# Patient Record
Sex: FEMALE | Race: WHITE | Employment: OTHER | URBAN - METROPOLITAN AREA
[De-identification: names, ages, dates, MRNs, and addresses within clinical notes are randomized per-mention and may not be internally consistent; named-entity substitution may affect disease eponyms.]

---

## 2024-04-22 ENCOUNTER — OFFICE VISIT (OUTPATIENT)
Dept: ORTHOPEDIC SURGERY | Facility: HOSPITAL | Age: 74
End: 2024-04-22
Payer: MEDICARE

## 2024-04-22 ENCOUNTER — HOSPITAL ENCOUNTER (OUTPATIENT)
Dept: RADIOLOGY | Facility: EXTERNAL LOCATION | Age: 74
Discharge: HOME | End: 2024-04-22

## 2024-04-22 DIAGNOSIS — M25.551 BILATERAL HIP PAIN: Primary | ICD-10-CM

## 2024-04-22 DIAGNOSIS — M67.952 TENDINOPATHY OF LEFT GLUTEUS MEDIUS: ICD-10-CM

## 2024-04-22 DIAGNOSIS — M25.552 BILATERAL HIP PAIN: Primary | ICD-10-CM

## 2024-04-22 DIAGNOSIS — M67.951 TENDINOPATHY OF RIGHT GLUTEUS MEDIUS: ICD-10-CM

## 2024-04-22 PROCEDURE — 2500000004 HC RX 250 GENERAL PHARMACY W/ HCPCS (ALT 636 FOR OP/ED): Performed by: EMERGENCY MEDICINE

## 2024-04-22 PROCEDURE — 99214 OFFICE O/P EST MOD 30 MIN: CPT | Performed by: EMERGENCY MEDICINE

## 2024-04-22 PROCEDURE — 76942 ECHO GUIDE FOR BIOPSY: CPT | Performed by: EMERGENCY MEDICINE

## 2024-04-22 PROCEDURE — 2500000005 HC RX 250 GENERAL PHARMACY W/O HCPCS: Performed by: EMERGENCY MEDICINE

## 2024-04-22 RX ORDER — TRIAMCINOLONE ACETONIDE 40 MG/ML
40 INJECTION, SUSPENSION INTRA-ARTICULAR; INTRAMUSCULAR
Status: COMPLETED | OUTPATIENT
Start: 2024-04-22 | End: 2024-04-22

## 2024-04-22 RX ORDER — ASPIRIN 81 MG/1
81 TABLET ORAL DAILY
COMMUNITY

## 2024-04-22 RX ORDER — CELECOXIB 200 MG/1
200 CAPSULE ORAL 2 TIMES DAILY
COMMUNITY

## 2024-04-22 RX ORDER — ALBUTEROL SULFATE 90 UG/1
AEROSOL, METERED RESPIRATORY (INHALATION)
COMMUNITY

## 2024-04-22 RX ORDER — TRIAMCINOLONE ACETONIDE 1 MG/G
CREAM TOPICAL
COMMUNITY

## 2024-04-22 RX ORDER — VALSARTAN 160 MG/1
1 TABLET ORAL
COMMUNITY
Start: 2022-09-19

## 2024-04-22 RX ORDER — AMOXICILLIN AND CLAVULANATE POTASSIUM 875; 125 MG/1; MG/1
875 TABLET, FILM COATED ORAL 2 TIMES DAILY
COMMUNITY
Start: 2024-04-21 | End: 2024-05-01

## 2024-04-22 RX ORDER — DUPILUMAB 200 MG/1.14ML
INJECTION, SOLUTION SUBCUTANEOUS
COMMUNITY

## 2024-04-22 RX ORDER — LIDOCAINE HYDROCHLORIDE 10 MG/ML
3 INJECTION INFILTRATION; PERINEURAL
Status: COMPLETED | OUTPATIENT
Start: 2024-04-22 | End: 2024-04-22

## 2024-04-22 RX ORDER — EPINEPHRINE 0.22MG
100 AEROSOL WITH ADAPTER (ML) INHALATION 2 TIMES DAILY
COMMUNITY

## 2024-04-22 RX ORDER — DULOXETIN HYDROCHLORIDE 60 MG/1
1 CAPSULE, DELAYED RELEASE ORAL
COMMUNITY
Start: 2024-01-03

## 2024-04-22 RX ORDER — HYDROCORTISONE 25 MG/G
CREAM TOPICAL
COMMUNITY
Start: 2022-09-08

## 2024-04-22 RX ORDER — SIMVASTATIN 20 MG/1
20 TABLET, FILM COATED ORAL
COMMUNITY

## 2024-04-22 RX ORDER — METOPROLOL SUCCINATE 50 MG/1
50 TABLET, EXTENDED RELEASE ORAL
COMMUNITY
Start: 2023-08-09

## 2024-04-22 RX ADMIN — LIDOCAINE HYDROCHLORIDE 3 ML: 10 INJECTION, SOLUTION INFILTRATION; PERINEURAL at 15:16

## 2024-04-22 RX ADMIN — LIDOCAINE HYDROCHLORIDE 3 ML: 10 INJECTION, SOLUTION INFILTRATION; PERINEURAL at 15:15

## 2024-04-22 RX ADMIN — TRIAMCINOLONE ACETONIDE 40 MG: 400 INJECTION, SUSPENSION INTRA-ARTICULAR; INTRAMUSCULAR at 15:16

## 2024-04-22 RX ADMIN — TRIAMCINOLONE ACETONIDE 40 MG: 400 INJECTION, SUSPENSION INTRA-ARTICULAR; INTRAMUSCULAR at 15:15

## 2024-08-22 ENCOUNTER — PREP FOR PROCEDURE (OUTPATIENT)
Dept: ORTHOPEDIC SURGERY | Facility: HOSPITAL | Age: 74
End: 2024-08-22

## 2024-08-22 ENCOUNTER — OFFICE VISIT (OUTPATIENT)
Dept: ORTHOPEDIC SURGERY | Facility: HOSPITAL | Age: 74
End: 2024-08-22
Payer: MEDICARE

## 2024-08-22 DIAGNOSIS — M67.951 TENDINOPATHY OF RIGHT GLUTEUS MEDIUS: ICD-10-CM

## 2024-08-22 DIAGNOSIS — M67.952 TENDINOPATHY OF LEFT GLUTEUS MEDIUS: ICD-10-CM

## 2024-08-22 DIAGNOSIS — M67.952 TENDINOPATHY OF LEFT GLUTEUS MEDIUS: Primary | ICD-10-CM

## 2024-08-22 PROCEDURE — 99213 OFFICE O/P EST LOW 20 MIN: CPT | Performed by: EMERGENCY MEDICINE

## 2024-08-22 PROCEDURE — 1159F MED LIST DOCD IN RCRD: CPT | Performed by: EMERGENCY MEDICINE

## 2024-08-22 NOTE — PROGRESS NOTES
Leilani Sullivan is a 73 y.o. female who presents for Follow-up of the Right Hip (DOLI- 4/22/24) and Follow-up of the Left Hip (DOLI- 4/22/24)    HPI    8/22/24: Patient returns today for reevaluation for bilateral lateral hip pain.  We did perform gluteus medius tendon insertion injections for her on 4/22/2024.  She does feel these worked well for her, however only for 1 month.  Considering this, she would like to discuss additional treatment options.    4/22/24: Patient returns today for bilateral hip pain.  We did perform bilateral hip gluteus medius tendon insertion injections on 1/9/2023.  She felt that they worked quite well and would like to have repeat injections today.  Since her last visit, she did have a pelvis MRI showing bilateral gluteus medius tendinosis without significant tearing.  She states that the previous injections did provide significant relief for approximately 3 to 4 months.  Additionally, she has low back pain and lumbar stenosis.    1/9/23: Patient returns today for bilateral hip pain. We did perform bilateral hip gluteus medius tendon injections on 1/31/2022. She states these were quite well for her up until recently. She presents today requesting repeat injections. She denies any further injuries. She has no additional complaints today.     1/31/22: 71-year-old female referred by Dr. Das for bilateral hip gluteus medius tendon insertion injections. She was evaluated and given home exercises, however would also like to have therapeutic injections.       ROS: All pertinent positive symptoms are included in the history of present illness.    All other systems have been reviewed and are negative and noncontributory to this patient's current ailments.    Objective     There were no vitals filed for this visit.    Physical Exam  General/Constitutional: No apparent distress. Well-nourished and well developed.  Head: Normocephalic, Atraumatic.   Eyes: EOMI.  Vascular: No edema,  swelling or tenderness, except as noted in detailed exam.  Respiratory: Non-labored breathing.  Integumentary: No impressive skin lesions present, except as noted in detailed exam.  Neurological: Alert and oriented.  Psychological: Normal mood and affect.  Musculoskeletal: Normal, except as noted in detailed exam.  Right hip: No rash. No erythema. No deformity. Exquisite TTP over the greater trochanter.  Left hip: No rash. No erythema. No deformity. Exquisite TTP over the greater trochanter.     Imaging:   I have personally reviewed and agree with Radiologist interpretation of previous imaging studies performed prior to this visit.   STUDY:  MRI of the pelvis without IV contrast;  5/3/2023 3:01 pm     INDICATION:  M76.899 Other specified enthesopathies of unspecified lower limb,  excluding foot   .     COMPARISON:  None.     ACCESSION NUMBER(S):  [id number]     ORDERING CLINICIAN:  JANA MANNING     TECHNIQUE:  MR imaging of the musculoskeletal pelvis was obtained  without  administration of intravenous contrast medium.     FINDINGS:  TENDONS:  There is thickening and edema of the bilateral gluteus minimus  tendons consistent with moderate tendinosis. Mild adjacent soft  tissue edema without trochanteric bursal fluid. The bilateral gluteus  minimus tendons are intact. There is mild bilateral hamstring origin  tendinosis with low grade intrasubstance tearing.  The insertions of the iliopsoas tendons are intact bilaterally.  The adductor tendon origins are intact bilaterally.     JOINTS:  There is mild diffuse thinning of the bilateral hip articular  cartilage.. There is no significant hip joint effusion. There is no  evidence of avascular necrosis.  The sacroiliac joints appear unremarkable without evidence of erosion  or significant degenerative change.  Moderate pubic symphyseal  degenerative change.  Lower lumbar degenerative changes are better characterized on  concomitant lumbar spine MRI.     OSSEOUS  STRUCTURES:  No focal marrow replacing lesions are identified. There is no  fracture.     SOFT TISSUES:  No muscle atrophy or tear is seen.  The sciatic nerves are intact and unremarkable.     INTERNAL ORGANS:  Evaluation of the internal organs of the pelvis is limited on this  study tailored for evaluation of the musculoskeletal system.  There is a fat containing ventral abdominal hernia.      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     Mild bilateral hip articular cartilage thinning.     Moderate pubic symphyseal degenerative change.     Fat containing ventral abdominal wall hernia.       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  I do feel that her symptoms are due to gluteus medius tendinopathy.  Considering continued pain despite previous therapeutic options including physical therapy and previous corticosteroid injections, I do feel that she would benefit from percutaneous tenotomy.  We discussed the risks versus benefits.  I have given her additional information for this.  She would likely schedule this in the near future.  She will likely proceed with the left hip first since that is more bothersome for her.  In the meantime, she can continue all activities as tolerated.    Steven Brink, DO  Sports Medicine  [facility], [facility] [place]    ** Please excuse any errors in grammar or translation related to this dictation. Voice recognition software was utilized to prepare this document. **

## 2024-10-24 NOTE — H&P
History Of Present Illness  Lissett Sullivan is a 74 y.o. female presenting with complaint of left lateral hip pain.  She has known bilateral hip gluteus medius tendinopathy.  We have tried previous conservative measures including activity modifications, physical therapy, analgesics, and corticosteroid injection therapy with limited relief.  Her most recent gluteus medius corticosteroid injection was performed on 4/22/2024.  Considering continued pain despite these previous measures, she presents today discuss the option of percutaneous tenotomy.     Past Medical History  She has no past medical history on file.    Surgical History  She has no past surgical history on file.     Social History  She has no history on file for tobacco use, alcohol use, and drug use.    Family History  No family history on file.     Allergies  Levofloxacin, Oseltamivir, Piroxicam, Adhesive, Adhesive tape-silicones, and Iodine    Review of Systems  All pertinent positive symptoms are included in the history of present illness.     All other systems have been reviewed and are negative and noncontributory to this patient's current ailments.       Physical Exam  General/Constitutional: No apparent distress. Well-nourished and well developed.  Head: Normocephalic, Atraumatic.   Eyes: EOMI.  Vascular: No edema, swelling or tenderness, except as noted in detailed exam.  Respiratory: Non-labored breathing.  Integumentary: No impressive skin lesions present, except as noted in detailed exam.  Neurological: Alert and oriented.  Psychological: Normal mood and affect.  Musculoskeletal: Normal, except as noted in detailed exam.  Right hip: No rash. No erythema. No deformity. Exquisite TTP over the greater trochanter.  Left hip: No rash. No erythema. No deformity. Exquisite TTP over the greater trochanter.        Last Recorded Vitals  There were no vitals taken for this visit.    Relevant Results  STUDY:  MRI of the pelvis without IV contrast;   5/3/2023 3:01 pm     INDICATION:  M76.899 Other specified enthesopathies of unspecified lower limb,  excluding foot   .     COMPARISON:  None.     ACCESSION NUMBER(S):  [id number]     ORDERING CLINICIAN:  JANA MANNING     TECHNIQUE:  MR imaging of the musculoskeletal pelvis was obtained  without  administration of intravenous contrast medium.     FINDINGS:  TENDONS:  There is thickening and edema of the bilateral gluteus minimus  tendons consistent with moderate tendinosis. Mild adjacent soft  tissue edema without trochanteric bursal fluid. The bilateral gluteus  minimus tendons are intact. There is mild bilateral hamstring origin  tendinosis with low grade intrasubstance tearing.  The insertions of the iliopsoas tendons are intact bilaterally.  The adductor tendon origins are intact bilaterally.     JOINTS:  There is mild diffuse thinning of the bilateral hip articular  cartilage.. There is no significant hip joint effusion. There is no  evidence of avascular necrosis.  The sacroiliac joints appear unremarkable without evidence of erosion  or significant degenerative change.  Moderate pubic symphyseal  degenerative change.  Lower lumbar degenerative changes are better characterized on  concomitant lumbar spine MRI.     OSSEOUS STRUCTURES:  No focal marrow replacing lesions are identified. There is no  fracture.     SOFT TISSUES:  No muscle atrophy or tear is seen.  The sciatic nerves are intact and unremarkable.     INTERNAL ORGANS:  Evaluation of the internal organs of the pelvis is limited on this  study tailored for evaluation of the musculoskeletal system.  There is a fat containing ventral abdominal hernia.     Impression  Moderate bilateral gluteus minimus insertional tendinosis without  tear.     Mild bilateral hip articular cartilage thinning.     Moderate pubic symphyseal degenerative change.     Fat containing ventral abdominal wall hernia.          Assessment/Plan   Assessment & Plan  Tendinopathy of left  gluteus medius    I discussed imaging and examination findings with patient.  I do feel that her symptoms are due to gluteus medius tendinopathy.  Considering continued pain despite previous therapeutic options including physical therapy and previous corticosteroid injections, I do feel that she would benefit from percutaneous tenotomy.  We discussed the risks versus benefits.  I have given her additional information for this.  She opted to proceed and returns today to proceed with a left hip gluteus medius percutaneous tenotomy procedure.         Rickey Brink, DO

## 2024-10-25 ENCOUNTER — HOSPITAL ENCOUNTER (OUTPATIENT)
Facility: CLINIC | Age: 74
Setting detail: OUTPATIENT SURGERY
Discharge: HOME | End: 2024-10-25
Attending: EMERGENCY MEDICINE | Admitting: EMERGENCY MEDICINE
Payer: MEDICARE

## 2024-10-25 VITALS
HEIGHT: 63 IN | OXYGEN SATURATION: 99 % | WEIGHT: 198.63 LBS | RESPIRATION RATE: 18 BRPM | TEMPERATURE: 98.1 F | BODY MASS INDEX: 35.2 KG/M2 | SYSTOLIC BLOOD PRESSURE: 134 MMHG | DIASTOLIC BLOOD PRESSURE: 61 MMHG | HEART RATE: 89 BPM

## 2024-10-25 DIAGNOSIS — M67.952 TENDINOPATHY OF LEFT GLUTEUS MEDIUS: Primary | ICD-10-CM

## 2024-10-25 PROCEDURE — 2720000007 HC OR 272 NO HCPCS: Performed by: EMERGENCY MEDICINE

## 2024-10-25 PROCEDURE — 7100000010 HC PHASE TWO TIME - EACH INCREMENTAL 1 MINUTE: Performed by: EMERGENCY MEDICINE

## 2024-10-25 PROCEDURE — 3600000008 HC OR TIME - EACH INCREMENTAL 1 MINUTE - PROCEDURE LEVEL THREE: Performed by: EMERGENCY MEDICINE

## 2024-10-25 PROCEDURE — 2500000004 HC RX 250 GENERAL PHARMACY W/ HCPCS (ALT 636 FOR OP/ED): Performed by: EMERGENCY MEDICINE

## 2024-10-25 PROCEDURE — 3600000003 HC OR TIME - INITIAL BASE CHARGE - PROCEDURE LEVEL THREE: Performed by: EMERGENCY MEDICINE

## 2024-10-25 PROCEDURE — 7100000009 HC PHASE TWO TIME - INITIAL BASE CHARGE: Performed by: EMERGENCY MEDICINE

## 2024-10-25 PROCEDURE — 27000 TENOTOMY ADDUCTOR HIP PERQ: CPT | Performed by: EMERGENCY MEDICINE

## 2024-10-25 PROCEDURE — 76942 ECHO GUIDE FOR BIOPSY: CPT | Performed by: EMERGENCY MEDICINE

## 2024-10-25 RX ORDER — OXYCODONE HYDROCHLORIDE 5 MG/1
5 TABLET ORAL EVERY 8 HOURS PRN
Qty: 9 TABLET | Refills: 0 | Status: SHIPPED | OUTPATIENT
Start: 2024-10-25 | End: 2024-10-28

## 2024-10-25 RX ORDER — LIDOCAINE HYDROCHLORIDE AND EPINEPHRINE 10; 10 MG/ML; UG/ML
INJECTION, SOLUTION INFILTRATION; PERINEURAL AS NEEDED
Status: DISCONTINUED | OUTPATIENT
Start: 2024-10-25 | End: 2024-10-25 | Stop reason: HOSPADM

## 2024-10-25 ASSESSMENT — PAIN - FUNCTIONAL ASSESSMENT
PAIN_FUNCTIONAL_ASSESSMENT: 0-10

## 2024-10-25 ASSESSMENT — COLUMBIA-SUICIDE SEVERITY RATING SCALE - C-SSRS
2. HAVE YOU ACTUALLY HAD ANY THOUGHTS OF KILLING YOURSELF?: NO
1. IN THE PAST MONTH, HAVE YOU WISHED YOU WERE DEAD OR WISHED YOU COULD GO TO SLEEP AND NOT WAKE UP?: NO
6. HAVE YOU EVER DONE ANYTHING, STARTED TO DO ANYTHING, OR PREPARED TO DO ANYTHING TO END YOUR LIFE?: NO

## 2024-10-25 ASSESSMENT — PAIN SCALES - GENERAL
PAINLEVEL_OUTOF10: 3
PAINLEVEL_OUTOF10: 8
PAINLEVEL_OUTOF10: 3

## 2024-10-25 NOTE — DISCHARGE INSTRUCTIONS
Post Procedure Instructions for Tenex or TenJet    Keep bandages and procedure area clean and dry for 7 days.   2.  Avoid submerging area in water (example, swimming/bathing) for 7 days.  3. May apply ice for 20 minutes as needed for discomfort.  4. May take over the counter pain medication or prescriptions, if provided.  5. Please make sure to follow up within one week or as directed by Dr. Brink.  6 Call [phone] (daytime) or [phone] (night/weekends) if you have questions or concerns.  7. E-mail [email or web address]    Please contact office if:  Area becomes red or hot to touch   Fever of 100 degrees or more  Increased pain or swelling  Drainage from treatment site    SPECIFIC PATIENT INSTRUCTIONS    HIP  Rest hip for first 3 days  Ambulatory assistance PRN  May being routine daily activities at 2 weeks  May gradually resume use of hip at 2 weeks as tolerated and subject to physician approval

## 2024-10-25 NOTE — OP NOTE
Left hip Glute Medius Tenjet Procedure (L) Operative Note     Date: 10/25/2024  OR Location: [facility] OR    Name: Lissett Sullivan, : 1950, Age: 74 y.o., MRN: [id number], Sex: female    Diagnosis  Pre-op Diagnosis      * Tendinopathy of left gluteus medius [M67.952] Post-op Diagnosis     * Tendinopathy of left gluteus medius [M67.952]     Procedures  Left hip Glute Medius Tenjet Procedure  30169 - [place] TENOTOMY ADDUCTOR HIP PERCUTANEOUS SPX      Surgeons      * Rickey Brink - Primary    Resident/Fellow/Other Assistant:  Surgeons and Role:  * No surgeons found with a matching role *    Procedure Summary  Anesthesia: Anesthesia type not filed in the log.  ASA: ASA status not filed in the log.  Anesthesia Staff: No anesthesia staff entered.  Estimated Blood Loss: 2mL  Intra-op Medications: Administrations occurring from 1330 to 1400 on 10/25/24:  * No intraprocedure medications in log *      Intraprocedure I/O Totals       None           Specimen: No specimens collected     Staff:   Circulator: Neema  Circulator: Sammi Jorge Person: Sean         Drains and/or Catheters: * None in log *    Tourniquet Times:         Implants:     Findings: Left Hip Gluteus Medius Tendinopathy    Indications: Lissett Sullivan is an 74 y.o. female who is having surgery for Tendinopathy of left gluteus medius [M67.952].     The patient was seen in the preoperative area. The risks, benefits, complications, treatment options, non-operative alternatives, expected recovery and outcomes were discussed with the patient. The possibilities of reaction to medication, pulmonary aspiration, injury to surrounding structures, bleeding, recurrent infection, the need for additional procedures, failure to diagnose a condition, and creating a complication requiring transfusion or operation were discussed with the patient. The patient concurred with the proposed plan, giving informed consent.  The site of surgery was properly noted/marked  if necessary per policy. The patient has been actively warmed in preoperative area. Preoperative antibiotics are not indicated. Venous thrombosis prophylaxis are not indicated.    Procedure Details:     PATIENT INDICATIONS: Patient presented with left Hip Gluteus Medius Tendinopathy.  PROCEDURE PERFORMED:  Percutaneous tenotomy of the gluteus medius tendon using ultrasound guidance to cut and remove the patient’s pathologic tissue, tendon and fascia.   DESCRIPTION OF PROCEDURE:   A sterile sleeve was placed over the ultrasound transducer. The anatomy was identified and the diseased tissue was visualized or confirmed at the gluteus medius tendon near the insertion. The area was prepped with an antimicrobial solution and the area was injected with [10]ml of 1% Lidocaine with Epinephrine using a 25 gauge needle. A skin wheal was placed and the involved tissue was anesthetized. A #11 blade was used to incise through the skin wheal about 1.5 cm distal to the site of maximum tenderness. The blade continued through the subcutaneous tissue and incised the tendon/fascia.  To section the gluteus medius tendon the surgical instrument is introduced through the incision advancing to the diseased tendon. Once the tip of instrument confirmed the pathologic tissue, the foot pedal was depressed and the tendon is incised along its length cutting and removing the diseased tendon tissue. Total tenotomy TenJte fluid 1L.  Following the procedure, steri-strips were placed, followed by Tegaderm. Patient was provided crutches to weight bear as tolerated for the next 2 days, then full weight bearing.   Post-op instructions given: Keep dressing clean and dry over the next week, take Oxycodone 5mg tablet every 8 hours as needed for pain, follow-up in my office in 1 week.    Complications:  None; patient tolerated the procedure well.    Disposition: PACU - hemodynamically stable.  Condition: stable         Rickey Brink  Phone Number:  [phone]

## 2024-10-28 ASSESSMENT — PAIN SCALES - GENERAL: PAINLEVEL_OUTOF10: 0 - NO PAIN

## 2024-11-01 ENCOUNTER — APPOINTMENT (OUTPATIENT)
Dept: ORTHOPEDIC SURGERY | Facility: CLINIC | Age: 74
End: 2024-11-01
Payer: COMMERCIAL

## 2024-11-01 VITALS — WEIGHT: 198 LBS | HEIGHT: 63 IN | BODY MASS INDEX: 35.08 KG/M2

## 2024-11-01 DIAGNOSIS — M67.952 TENDINOPATHY OF LEFT GLUTEUS MEDIUS: ICD-10-CM

## 2024-11-01 PROCEDURE — 99024 POSTOP FOLLOW-UP VISIT: CPT | Performed by: EMERGENCY MEDICINE

## 2024-11-01 PROCEDURE — 1159F MED LIST DOCD IN RCRD: CPT | Performed by: EMERGENCY MEDICINE

## 2024-11-01 PROCEDURE — 3008F BODY MASS INDEX DOCD: CPT | Performed by: EMERGENCY MEDICINE

## 2024-11-01 PROCEDURE — 1125F AMNT PAIN NOTED PAIN PRSNT: CPT | Performed by: EMERGENCY MEDICINE

## 2024-11-01 ASSESSMENT — PAIN - FUNCTIONAL ASSESSMENT: PAIN_FUNCTIONAL_ASSESSMENT: 0-10

## 2024-11-01 ASSESSMENT — PAIN DESCRIPTION - DESCRIPTORS: DESCRIPTORS: SORE

## 2024-11-01 ASSESSMENT — PAIN SCALES - GENERAL: PAINLEVEL_OUTOF10: 2

## 2024-11-02 ENCOUNTER — HOSPITAL ENCOUNTER (OUTPATIENT)
Facility: CLINIC | Age: 74
Setting detail: OUTPATIENT SURGERY
End: 2024-11-02
Attending: EMERGENCY MEDICINE | Admitting: EMERGENCY MEDICINE
Payer: COMMERCIAL

## 2024-11-02 ENCOUNTER — PREP FOR PROCEDURE (OUTPATIENT)
Dept: ORTHOPEDIC SURGERY | Facility: HOSPITAL | Age: 74
End: 2024-11-02
Payer: COMMERCIAL

## 2024-11-02 DIAGNOSIS — M67.951 TENDINOPATHY OF RIGHT GLUTEUS MEDIUS: ICD-10-CM

## 2024-12-06 ENCOUNTER — APPOINTMENT (OUTPATIENT)
Dept: ORTHOPEDIC SURGERY | Facility: CLINIC | Age: 74
End: 2024-12-06
Payer: COMMERCIAL

## 2024-12-06 DIAGNOSIS — M67.952 TENDINOPATHY OF LEFT GLUTEUS MEDIUS: ICD-10-CM

## 2024-12-06 NOTE — PROGRESS NOTES
Leilani Sullivan is a 74 y.o. female who presents for No chief complaint on file.    HPI    12/6/24: Patient returns today for reevaluation status post left hip gluteus medius tendon percutaneous tenotomy.  Date of procedure 10/25/2024.  Overall, she does feel that she is improving.  She does feel approximately 50% better.  She has been working on physical therapy.  No additional injuries or complaints.    11/1/24: 74-year-old female presents today for reevaluation status post left hip gluteus medius tendon percutaneous tenotomy.  Date of procedure 10/25/2024.  Overall, she feels that she is doing well.  She does feel that her pain has improved significantly since prior to the procedure.  She has not required ambulatory assistance.  She has been able to keep the wound clean and dry.    ROS: All pertinent positive symptoms are included in the history of present illness.    All other systems have been reviewed and are negative and noncontributory to this patient's current ailments.    Objective     There were no vitals filed for this visit.      Physical Exam  General/Constitutional: No apparent distress. Well-nourished and well developed.  Head: Normocephalic, Atraumatic.   Eyes: EOMI.  Vascular: No edema, swelling or tenderness, except as noted in detailed exam.  Respiratory: Non-labored breathing.  Integumentary: No impressive skin lesions present, except as noted in detailed exam.  Neurological: Alert and oriented.  Psychological:  Normal mood and affect.  Musculoskeletal: Normal, except as noted in detailed exam.  Left hip: Incision well-healed.  Ecchymosis resolved.  Mild tenderness along the peritrochanteric region.  Full strength and range of motion with abduction.        Assessment/Plan   Problem List Items Addressed This Visit       Tendinopathy of left gluteus medius    Relevant Orders    Referral to Physical Therapy     Overall, I feel that she is doing well.  Incision is well-healed without  signs of infection.  At this point, she can progress to all activities without restriction.  I have given her an updated physical therapy referral to this regard.  I would like see her back as needed if pain persists.    Steven Brink, DO  Sports Medicine  [facility], [facility] [place]    ** Please excuse any errors in grammar or translation related to this dictation. Voice recognition software was utilized to prepare this document. **

## 2024-12-20 ENCOUNTER — TELEPHONE (OUTPATIENT)
Dept: ORTHOPEDIC SURGERY | Facility: CLINIC | Age: 74
End: 2024-12-20
Payer: COMMERCIAL

## 2024-12-20 NOTE — TELEPHONE ENCOUNTER
Copied from CRM #[id number]. Topic: Information Request - Doctor, Hospital, or Provider  >> Dec 18, 2024 12:25 PM Ashlee WASHINGTON wrote:  PT WOULD LIKE Rickey Brink TO GIVE HER A CALL IN REGARDS TO SURGERY DATE

## 2024-12-23 ENCOUNTER — PREP FOR PROCEDURE (OUTPATIENT)
Dept: ORTHOPEDIC SURGERY | Facility: HOSPITAL | Age: 74
End: 2024-12-23
Payer: COMMERCIAL

## 2025-01-17 ENCOUNTER — APPOINTMENT (OUTPATIENT)
Dept: ORTHOPEDIC SURGERY | Facility: CLINIC | Age: 75
End: 2025-01-17
Payer: COMMERCIAL

## 2025-01-31 ENCOUNTER — TELEPHONE (OUTPATIENT)
Dept: ORTHOPEDIC SURGERY | Facility: HOSPITAL | Age: 75
End: 2025-01-31
Payer: COMMERCIAL

## 2025-01-31 NOTE — TELEPHONE ENCOUNTER
Called patient in response to a message that she left with the office stating that she had accidentally cancelled her appointment and rescheduled and was worried about her x-rays still being done. Spoke with patient and let her know that her appointment was on the schedule for both her visit with Dr. Abraham and her x-rays for 10 am on Monday. Let her know that if she arrived 5-10 minutes early that would be plenty. She asked if the x-ray would be done in the same location as the visit. I let her know that yes they would both be done in [address] of the [facility] at [facility]. [place]

## 2025-02-03 ENCOUNTER — HOSPITAL ENCOUNTER (OUTPATIENT)
Dept: RADIOLOGY | Facility: HOSPITAL | Age: 75
Discharge: HOME | End: 2025-02-03
Payer: MEDICARE

## 2025-02-03 ENCOUNTER — APPOINTMENT (OUTPATIENT)
Dept: ORTHOPEDIC SURGERY | Facility: HOSPITAL | Age: 75
End: 2025-02-03
Payer: COMMERCIAL

## 2025-02-03 ENCOUNTER — OFFICE VISIT (OUTPATIENT)
Dept: ORTHOPEDIC SURGERY | Facility: HOSPITAL | Age: 75
End: 2025-02-03
Payer: MEDICARE

## 2025-02-03 ENCOUNTER — APPOINTMENT (OUTPATIENT)
Dept: RADIOLOGY | Facility: HOSPITAL | Age: 75
End: 2025-02-03
Payer: COMMERCIAL

## 2025-02-03 VITALS — BODY MASS INDEX: 32.78 KG/M2 | WEIGHT: 185 LBS | HEIGHT: 63 IN

## 2025-02-03 DIAGNOSIS — M54.12 CERVICAL RADICULOPATHY: Primary | ICD-10-CM

## 2025-02-03 DIAGNOSIS — M25.512 BILATERAL SHOULDER PAIN, UNSPECIFIED CHRONICITY: ICD-10-CM

## 2025-02-03 DIAGNOSIS — M25.511 BILATERAL SHOULDER PAIN, UNSPECIFIED CHRONICITY: Primary | ICD-10-CM

## 2025-02-03 DIAGNOSIS — M25.512 BILATERAL SHOULDER PAIN, UNSPECIFIED CHRONICITY: Primary | ICD-10-CM

## 2025-02-03 DIAGNOSIS — M25.511 BILATERAL SHOULDER PAIN, UNSPECIFIED CHRONICITY: ICD-10-CM

## 2025-02-03 PROCEDURE — 99213 OFFICE O/P EST LOW 20 MIN: CPT | Performed by: ORTHOPAEDIC SURGERY

## 2025-02-03 PROCEDURE — 73030 X-RAY EXAM OF SHOULDER: CPT | Mod: 50

## 2025-02-03 PROCEDURE — 73030 X-RAY EXAM OF SHOULDER: CPT | Mod: BILATERAL PROCEDURE | Performed by: RADIOLOGY

## 2025-02-03 PROCEDURE — 3008F BODY MASS INDEX DOCD: CPT | Performed by: ORTHOPAEDIC SURGERY

## 2025-02-03 PROCEDURE — 1159F MED LIST DOCD IN RCRD: CPT | Performed by: ORTHOPAEDIC SURGERY

## 2025-02-03 RX ORDER — METHYLPREDNISOLONE 4 MG/1
TABLET ORAL
Qty: 21 TABLET | Refills: 0 | Status: SHIPPED | OUTPATIENT
Start: 2025-02-03 | End: 2025-02-09

## 2025-02-06 ENCOUNTER — HOSPITAL ENCOUNTER (OUTPATIENT)
Facility: CLINIC | Age: 75
Setting detail: OUTPATIENT SURGERY
End: 2025-02-06
Attending: EMERGENCY MEDICINE | Admitting: EMERGENCY MEDICINE
Payer: MEDICARE

## 2025-02-06 ENCOUNTER — PREP FOR PROCEDURE (OUTPATIENT)
Dept: ORTHOPEDIC SURGERY | Facility: HOSPITAL | Age: 75
End: 2025-02-06
Payer: COMMERCIAL

## 2025-02-06 DIAGNOSIS — M67.951 TENDINOPATHY OF RIGHT GLUTEUS MEDIUS: ICD-10-CM

## 2025-02-06 NOTE — PROGRESS NOTES
This is a 74-year-old female comes in today for chief complaint of bilateral shoulder pain right worse than left been going on for about 2 or so.  She denies a specific injury she says she has pain radiating from her neck into her shoulder and then down into her hands right side worse than left side.  She has not had any treatment for this.  Comes to see me today for further evaluation    Pleasant patient in no acute distress, alert and oriented x3, of normal mood and affect    they have no cervical or axillary lymphadenopathy.  they are breathing without any evidence of accessory musculature.  Their pupils are equal and reactive to light    Their neck is supple, nontender  They have intact sensation to light touch in all upper extremity dermatomes.  Their skin is intact    They have forward elevation of the shoulder 180°, internal rotation of 90°, external rotation of 90°  They Have 5 out of 5 Rotator cuff strength testing with resisted supraspinatus testing and infraspinatus testing  They have a normal belly press and lift off    They have mild tenderness to palpation over the long head of the biceps in the groove  They have a negative speed's test  They have an equivocal Yergason's test  They have s a positive Neer test, positive Barrios test    No a.c. joint tenderness.  Negative cross body    They have a negative O'Briens    X-rays of the shoulder reveal no fractures or dislocations well-preserved joint space    Patient's symptoms are most consistent with a cervical radiculopathy we are recommending a steroid Dosepak as well as physical therapy for her neck.  If she does not see significant improvement we will have her follow-up with Dr. Xenia Cosby

## 2025-02-21 ENCOUNTER — APPOINTMENT (OUTPATIENT)
Dept: ORTHOPEDIC SURGERY | Facility: CLINIC | Age: 75
End: 2025-02-21
Payer: COMMERCIAL

## 2025-02-26 ENCOUNTER — APPOINTMENT (OUTPATIENT)
Dept: ORTHOPEDIC SURGERY | Facility: HOSPITAL | Age: 75
End: 2025-02-26
Payer: MEDICARE

## 2025-03-03 ENCOUNTER — TELEPHONE (OUTPATIENT)
Dept: ORTHOPEDIC SURGERY | Facility: HOSPITAL | Age: 75
End: 2025-03-03
Payer: COMMERCIAL

## 2025-03-03 NOTE — TELEPHONE ENCOUNTER
Called patient in response to a message that she left with office saying that she had some questions and had called last week and hadn't heard back. Spoke with patient and she said that she was calling to get a referral for pain management but spoke with the place where she is going and doesn't need one. She said she is going to a place closer to home through the [facility]. I asked her if she needed anything else and she said she is all set. CT

## 2025-03-13 ENCOUNTER — APPOINTMENT (OUTPATIENT)
Dept: PAIN MEDICINE | Facility: HOSPITAL | Age: 75
End: 2025-03-13
Payer: MEDICARE

## 2025-04-10 ENCOUNTER — APPOINTMENT (OUTPATIENT)
Dept: ORTHOPEDIC SURGERY | Facility: HOSPITAL | Age: 75
End: 2025-04-10
Payer: COMMERCIAL

## (undated) DEVICE — TENJET PERCUTANEOUS HYDROTENOTOMY SET

## (undated) DEVICE — STOCKINETTE, COTTON, ORTHOPEDIC,  3 IN X 25YD, NS

## (undated) DEVICE — DRAPE, SHEET, THREE QUARTER, FAN FOLD, 57 X 77 IN

## (undated) DEVICE — MARKER, SKIN, REGULAR TIP, W/W/FLEXI RULER, LABEL

## (undated) DEVICE — COVER, TABLE, 44 X 75 IN, DISPOSABLE, LF, STERILE

## (undated) DEVICE — DRAPE, SHEET, FAN FOLDED, HALF, 44 X 58 IN, DISPOSABLE, LF, STERILE

## (undated) DEVICE — APPLICATOR, CHLORAPREP, W/ORANGE TINT, 26ML

## (undated) DEVICE — CLAMP, TOWEL, BACKHAUS

## (undated) DEVICE — SOLUTION, SODIUM CHLORIDE, .9 PERCENT, 15 ML, LF

## (undated) DEVICE — GLOVE, SURGICAL, PROTEXIS PI W/NEU-THERA, 8.0, PF, LF

## (undated) DEVICE — BANDAGE, ELASTIC, REINFORCED, ECONO-WRAP, 4 IN X 4.5 YD, LATEX

## (undated) DEVICE — DRESSING, SPONGE, GAUZE, CURITY, 4 X 4 IN, STERILE